# Patient Record
Sex: MALE | Race: WHITE | Employment: OTHER | URBAN - NONMETROPOLITAN AREA
[De-identification: names, ages, dates, MRNs, and addresses within clinical notes are randomized per-mention and may not be internally consistent; named-entity substitution may affect disease eponyms.]

---

## 2021-05-03 ENCOUNTER — HOSPITAL ENCOUNTER (INPATIENT)
Age: 51
LOS: 3 days | Discharge: HOME | DRG: 881 | End: 2021-05-06
Attending: PSYCHIATRY & NEUROLOGY | Admitting: PSYCHIATRY & NEUROLOGY
Payer: OTHER GOVERNMENT

## 2021-05-03 PROBLEM — F32.A DEPRESSION WITH SUICIDAL IDEATION: Status: ACTIVE | Noted: 2021-05-03

## 2021-05-03 PROBLEM — R45.851 DEPRESSION WITH SUICIDAL IDEATION: Status: ACTIVE | Noted: 2021-05-03

## 2021-05-03 PROCEDURE — 99222 1ST HOSP IP/OBS MODERATE 55: CPT | Performed by: PSYCHIATRY & NEUROLOGY

## 2021-05-03 PROCEDURE — 6370000000 HC RX 637 (ALT 250 FOR IP): Performed by: PSYCHIATRY & NEUROLOGY

## 2021-05-03 PROCEDURE — 6370000000 HC RX 637 (ALT 250 FOR IP): Performed by: FAMILY MEDICINE

## 2021-05-03 PROCEDURE — 1240000000 HC EMOTIONAL WELLNESS R&B

## 2021-05-03 RX ORDER — GAUZE BANDAGE 2" X 2"
100 BANDAGE TOPICAL DAILY
Status: DISCONTINUED | OUTPATIENT
Start: 2021-05-03 | End: 2021-05-06 | Stop reason: HOSPADM

## 2021-05-03 RX ORDER — HYDROXYZINE HYDROCHLORIDE 10 MG/1
10 TABLET, FILM COATED ORAL 3 TIMES DAILY PRN
Status: DISCONTINUED | OUTPATIENT
Start: 2021-05-03 | End: 2021-05-06 | Stop reason: HOSPADM

## 2021-05-03 RX ORDER — HYDROCODONE BITARTRATE AND ACETAMINOPHEN 5; 325 MG/1; MG/1
1 TABLET ORAL EVERY 6 HOURS PRN
Status: ON HOLD | COMMUNITY
Start: 2021-02-21 | End: 2021-05-06 | Stop reason: HOSPADM

## 2021-05-03 RX ORDER — FOLIC ACID 1 MG/1
1 TABLET ORAL DAILY
Status: DISCONTINUED | OUTPATIENT
Start: 2021-05-03 | End: 2021-05-06 | Stop reason: HOSPADM

## 2021-05-03 RX ORDER — POLYETHYLENE GLYCOL 3350 17 G/17G
17 POWDER, FOR SOLUTION ORAL DAILY PRN
Status: DISCONTINUED | OUTPATIENT
Start: 2021-05-03 | End: 2021-05-06 | Stop reason: HOSPADM

## 2021-05-03 RX ORDER — LORAZEPAM 1 MG/1
1 TABLET ORAL EVERY 4 HOURS PRN
Status: DISCONTINUED | OUTPATIENT
Start: 2021-05-03 | End: 2021-05-06 | Stop reason: HOSPADM

## 2021-05-03 RX ORDER — MIRTAZAPINE 7.5 MG/1
7.5 TABLET, FILM COATED ORAL NIGHTLY
Status: DISCONTINUED | OUTPATIENT
Start: 2021-05-03 | End: 2021-05-06 | Stop reason: HOSPADM

## 2021-05-03 RX ORDER — MULTIVITAMIN WITH IRON
1 TABLET ORAL DAILY
Status: DISCONTINUED | OUTPATIENT
Start: 2021-05-03 | End: 2021-05-06 | Stop reason: HOSPADM

## 2021-05-03 RX ORDER — LORAZEPAM 1 MG/1
2 TABLET ORAL EVERY 4 HOURS PRN
Status: DISCONTINUED | OUTPATIENT
Start: 2021-05-03 | End: 2021-05-06 | Stop reason: HOSPADM

## 2021-05-03 RX ORDER — NICOTINE 21 MG/24HR
1 PATCH, TRANSDERMAL 24 HOURS TRANSDERMAL DAILY
Status: DISCONTINUED | OUTPATIENT
Start: 2021-05-03 | End: 2021-05-06 | Stop reason: HOSPADM

## 2021-05-03 RX ORDER — TIZANIDINE 4 MG/1
4 TABLET ORAL EVERY 8 HOURS PRN
Status: ON HOLD | COMMUNITY
End: 2021-05-06 | Stop reason: HOSPADM

## 2021-05-03 RX ORDER — ACETAMINOPHEN 325 MG/1
650 TABLET ORAL EVERY 4 HOURS PRN
Status: DISCONTINUED | OUTPATIENT
Start: 2021-05-03 | End: 2021-05-04

## 2021-05-03 RX ORDER — BUSPIRONE HYDROCHLORIDE 10 MG/1
10 TABLET ORAL 3 TIMES DAILY
Status: ON HOLD | COMMUNITY
End: 2021-05-06 | Stop reason: HOSPADM

## 2021-05-03 RX ADMIN — HYDROXYZINE HYDROCHLORIDE 10 MG: 10 TABLET, FILM COATED ORAL at 21:01

## 2021-05-03 RX ADMIN — MIRTAZAPINE 7.5 MG: 7.5 TABLET ORAL at 21:00

## 2021-05-03 RX ADMIN — FOLIC ACID 1 MG: 1 TABLET ORAL at 21:00

## 2021-05-03 RX ADMIN — THIAMINE HCL TAB 100 MG 100 MG: 100 TAB at 21:00

## 2021-05-03 RX ADMIN — THERA TABS 1 TABLET: TAB at 21:00

## 2021-05-03 ASSESSMENT — PAIN DESCRIPTION - PROGRESSION: CLINICAL_PROGRESSION: NOT CHANGED

## 2021-05-03 ASSESSMENT — PAIN DESCRIPTION - ONSET: ONSET: ON-GOING

## 2021-05-03 ASSESSMENT — SLEEP AND FATIGUE QUESTIONNAIRES
SLEEP PATTERN: DIFFICULTY FALLING ASLEEP;DISTURBED/INTERRUPTED SLEEP;INSOMNIA;RESTLESSNESS;NIGHTMARES/TERRORS
DIFFICULTY FALLING ASLEEP: YES
DO YOU USE A SLEEP AID: NO
DIFFICULTY ARISING: NO

## 2021-05-03 ASSESSMENT — PAIN - FUNCTIONAL ASSESSMENT: PAIN_FUNCTIONAL_ASSESSMENT: ACTIVITIES ARE NOT PREVENTED

## 2021-05-03 ASSESSMENT — PAIN DESCRIPTION - DESCRIPTORS: DESCRIPTORS: ACHING

## 2021-05-03 ASSESSMENT — PAIN SCALES - GENERAL: PAINLEVEL_OUTOF10: 2

## 2021-05-03 ASSESSMENT — PAIN DESCRIPTION - LOCATION: LOCATION: SHOULDER

## 2021-05-03 ASSESSMENT — PAIN DESCRIPTION - PAIN TYPE: TYPE: CHRONIC PAIN

## 2021-05-03 NOTE — PROGRESS NOTES
SW met with treatment team to discuss pt's progress and setbacks. SW 2 was present. Pt was admitted, for SI, HI, alcohol abuse, threatened to shoot himself and his spouse in the head, has hx of psychiatric treatment with [facility], pt did not have a gun, has not been taking his medications, identifies current stressors as the death of his friend in a  accident, endorses AVH. Since admission, pt reportedly was cooperative, but groggy from medications given prior to admission, limited information was obtained, pt complained of being tired and wanting to go to sleep, denied SI/HI/AVH, continue current treatment plan.

## 2021-05-03 NOTE — PROGRESS NOTES
Behavioral Services  Medicare Certification Upon Admission    I certify that this patient's inpatient psychiatric hospital admission is medically necessary for:    [x] (1) Treatment which could reasonably be expected to improve this patient's condition,       [] (2) Or for diagnostic study;     AND     [x](2) The inpatient psychiatric services are provided while the individual is under the care of a physician and are included in the individualized plan of care.     Estimated length of stay/service 5 days to 7 weeks    Plan for post-hospital care TBA    Electronically signed by Ant Pena MD on 5/3/2021 at 2:31 PM

## 2021-05-03 NOTE — PROGRESS NOTES
`[facility]al [facility] [place]  Admission Note     Admission Type:   Admission Type: Voluntary    Reason for admission:  Reason for Admission: Miguel Arcos is a 45 YOA WM. He was seen at [facility] 05/02/2021 PM for SI, HI, ETOH intoxication, psychosis and auditory hallucinations. Per report from transferring facility patient was intoxicated 5/2/21 PM and threatened to shoot himself and his spouse in the head. Patient did not have gun during threating statements. Patient had initial BA of 113, approximately two hours later BA was down to 62. Patient was assessed by [facility] and need for admission was determined. Patient c/o PTSD triggered a few days ago when his friend was in severe  accident. Patient endorses not taking prescibed psychiatric medications and recent feelings of \"all alone\" with self isolation. Upon arrival to this facility patient denies SI, HI, AVH and c/o \"I am very tired from taking that Trazodone. \"  Admission interview was limited d/t this. PATIENT STRENGTHS: UTD at this time. Patient Strengths and Limitations: UTD at this time       Addictive Behavior: Yes, past h/o of ETOH abuse with current intermittent ETOH use. Medical Problems:   No past medical history on file. Status EXAM:  Status and Exam  Normal: No  Facial Expression: Flat  Affect: Congruent  Level of Consciousness: Alert  Mood:Normal: No  Mood: Depressed, Anxious, Labile  Motor Activity:Normal: No  Motor Activity: Decreased  Interview Behavior: Cooperative  Preception: [place] to Person, Allena Zayra to Time, [place] to Place, [place] to Situation  Attention:Normal: No  Attention: Unable to Concentrate  Thought Processes: Circumstantial  Thought Content:Normal: No  Thought Content: Other(See Comment), Preoccupations(sleepy r/t Trazodone given prior to admission)  Hallucinations:  Auditory (Comment)(voices on and off)  Delusions: No  Memory:Normal: No  Memory: Poor Recent, Poor Remote  Insight and Judgment: No  Insight and Judgment: Poor Judgment, Poor Insight  Present Suicidal Ideation: No(not at this time upon admission)  Present Homicidal Ideation: No    Tobacco Screening:  Practical Counseling, on admission, lorna X, if applicable and completed (first 3 are required if patient doesn't refuse):            ( )  Recognizing danger situations (included triggers and roadblocks)                    ( )  Coping skills (new ways to manage stress, exercise, relaxation techniques, changing routine, distraction)                                                           ( )  Basic information about quitting (benefits of quitting, techniques in how to quit, available resources  ( ) Referral for counseling faxed to [facility]                                           (Y ) Patient refused counseling  ( ) Patient has not smoked in the last 30 days    Metabolic Screening:    No results found for: LABA1C    No results found for: CHOL  No results found for: TRIG  No results found for: HDL  No components found for: LDLCAL  No results found for: LABVLDL      Body mass index is 25.37 kg/m². BP Readings from Last 2 Encounters:   05/03/21 105/68           Pt admitted with followings belongings:  Dentures: None(upper missing teeth are missing)  Vision - Corrective Lenses: Glasses(reading glasses, are not with patient at time of admission)  Hearing Aid: Bilateral hearing aids  Jewelry: None  Body Piercings Removed: N/A  Clothing: Shirt, Footwear(swim shorts)  Were All Patient Medications Collected?: Yes(one bottle of medication upon admission,)  Other Valuables: Cell phone, Money (Comment), Wallet      Valuables placed in safe in security envelope. . Patient's home medications were, placed in locked cabinet in [place] [address] medication room, (1 bottle of medications brought with patient). .  Patient oriented to surroundings and program expectations and copy of patient rights given.  Received

## 2021-05-03 NOTE — H&P
Department of Psychiatry  Attending History and Physical        CHIEF COMPLAINT:  \"I'm ok\"    History obtained from: patient, chart    HISTORY OF PRESENT ILLNESS:    46 yo white male with history of depression, anxiety, PTSD, alcohol use disorder, transferred from [facility] for SI and HI towards his wife. He reportedly threatened to shoot himself and his wife.  originally. Patient was assessed by [facility]. He has been noncompliant with his psychotropics at home. Patient is observed resting in bed this morning. He is calm and cooperative. Appears drowsy. Denies suicidal and homicidal ideation at this time. Reports some depression and anxiety. States his PTSD was recently triggered by his friend's  accident. He was drinking prior to admission and got into an argument with his wife. States they have been having arguments for a while and he has been feeling angry a lot. He denies any recent stressors. Feels that pandemic has affected him a lot. \"I no longer leave my house. \"  Patient reports occasional mood swings and racing thoughts. He reports good sleep and appetite. No nightmares and flashbacks. Often feels irritable. Most recently, started on BuSpar for anxiety. He is open to medication adjustment. States trazodone made him groggy this morning. He denies withdrawal symptoms. Gave us permission to talk to his wife. PSYCHIATRIC HISTORY:    Diagnoses: depression, PTSD  Suicide attempts/gestures: Denies   Prior hospitalizations: [facility] - 4 yrs ago    Medication [facility]: [facility]  [facility]  Head trauma: Denies    SUBSTANCE USE HISTORY:  Drinks beer occasionally. Denies illicit drug use. Dips tobacco.     Past Medical History:    Chronic pain     Past Surgical History:    No past surgical history on file. Medications Prior to Admission:   Buspar 10 mg tid    Allergies:  Patient has no known allergies. Social History:  , on disability.  Former marine. Family History:   No history of psychiatric illness or suicide attempts. REVIEW OF SYSTEMS:  General: No fevers, chills, night sweats, no recent weight loss or gain. Head: No headache, no migraine. Eyes: No recent visual changes. Ears: No recent hearing changes. Nose: No increased congestion or change in sense of smell. Throat: No sore throat, no trouble swallowing. Cardiovascular: No chest pain or palpitations, or dizziness. Respiratory: No cough, wheezes, congestion, or shortness of breath. Gastrointestinal: No abdominal pain, nausea or vomiting, no diarrhea or constipation. Musculo-skeletal: No edema, deformities, or loss of functions. Neurological: No loss of consciousness, abnormal sensations, or weakness. Skin: No rash, abrasions or bruises. PHYSICAL EXAM:  GENERAL APPEARANCE: 45y.o. year-old male in NAD   HEAD: Normocephalic, atraumatic. THROAT: No erythema, exudates, lesions. No tongue laceration. CARDIOVASCULAR: PMI nondisplaced. Regular rhythm and rate. Normal S1 and S2.  PULMONARY: Clear to auscultation bilaterally, no tenderness to palpation. ABDOMEN: Soft, nontender, nondistended. MUSCULOSKELTAL: No obvious deformities, clubbing, cyanosis or edema, no spinous process or paraspinous tenderness, normal ROM, normal gait, distal pulses intact symmetric 2+ bilaterally. NEUROLOGICAL: Alert, oriented x 3, CN II-XII grossly intact, motor strength 5/5 all muscle groups, DTR 2+ intact and symmetric, sensation intact to sharp and dull. No abnormal movements or tremors. SKIN: Warm, dry, intact, no rash, abrasions bruises    Vitals:  /68   Pulse 80   Temp 97.6 °F (36.4 °C) (Temporal)   Resp 18   Ht 5' 4\" (1.626 m)   Wt 147 lb 12.8 oz (67 kg)   SpO2 100%   BMI 25.37 kg/m²     Mental Status Examination:    Appearance: Stated age. Gait stable. No abnormal movements or tremor. Behavior: Calm, cooperative, withdrawn  Speech: Normal in tone, volume, and quality.  No slurring, dysarthria or pressured speech noted. Mood: \"Tired \"   Affect: Mood congruent. Thought Process: Appears linear. Thought Content: Denies SI/HI. No overt delusions or paranoia appreciated. Perceptions: Denies auditory or visual hallucinations at present time. Not responding to internal stimuli. Concentration: Intact. Orientation: to person, place, date, and situation. Language: Intact. Fund of information: Intact. Memory: Recent and remote appear intact. Neurovegitative: Fair appetite and sleep. Insight: Limited. Judgment: Limited. DATA:  No results found for: WBC, HGB, HCT, MCV, PLT  No results found for: NA, K, CL, CO2, BUN, CREATININE, GLUCOSE, CALCIUM, PROT, LABALBU, BILITOT, ALKPHOS, AST, ALT, LABGLOM, GFRAA, AGRATIO, GLOB    ASSESSMENT AND PLAN:  DSM-5 DIAGNOSIS:   Impression:  Mood disorder unspecified  R/o substance-induced mood disorder  PTSD by history  Alcohol use disorder, severe  Tobacco use disorder    Patient presents with limited insight and judgement and is meeting the criteria for inpatient psychiatric treatment. Plan:   -Admit to [facility] Unit and monitor on 15 minute checks. Suicide, fall and seizure precautions.  Levora Miss reviewed. -Gather collateral information from family with release.  -Medical monitoring to be performed by Dr. Jr Noel and associates. Order routine labs. [facility] protocol.   -Acclimate to the unit. Provide supportive psychotherapy.  -Encourage participation in groups and therapeutic activities as appropriate. Work on coping skills. -Medications:    A trial of Remeron to help with depressed mood and sleep. Discussed alternatives, benefits & risks with patient including - but not limited to - black box warning regarding increase in suicidality (though in children & young adults and lack of evidence of increased risk in those over 24 yrs.  old), agranulocytosis, possibility of death given concurrent untreated sleep apnea, hypotension, worsening mood, hyponatremia, Mcallister-Alex syndrome, weight gain, dizziness, abnormal dreams/thinking, confusion, myalgia, elevated LFTs. PRN Atarax for anxiety. Discussed benefits, alternatives, and risks including but not limited to wheezing, dyspnea, seizures, dry mouth, dizziness, increased fall risk, agitation, nausea. Advised caution in operating vehicles/machinery after taking, especially if sedation occurs.      Offer nicotine patch to help with nicotine withdrawal.    -The risks, benefits, side effects, indications, contraindications, and adverse effects of the medications have been discussed.  -The patient has verbalized understanding and has capacity to give informed consent.  -SW help evaluate home environment and provide outpatient resources.  -Discuss with treatment team.

## 2021-05-03 NOTE — PROGRESS NOTES
SW completed psychosocial and CSSR-S on this date. Pt long and short term goals discussed. Pt voiced understanding. Treatment plan sheet signed. Pt verbalized understanding of the treatment plan. Pt participated in goals and objectives of the treatment plan. It was identified that pt will require outpatient follow up appointments at local [facility] behavioral health facility such as[address]. Pt validated need for appointments. Pt was also provided a handout of contact information for drug and alcohol treatment centers and other community support service such as [facility]. In the last 6 months has the pt been danger to self: Yes  In the last 6 months has the pt been danger to others: No  Legal Guardian/POA: No      Provided pt with [employer] handout entitled \"Quitting Smoking,\" reviewed handout with pt addressing dangers of smoking, developing coping skills, and providing basic information about quitting. Patient received all components / Patient refused/declined practical counseling of tobacco practical counseling during the hospital stay.      Electronically signed by Horacio Brown on 5/3/2021 at 10:35 AM

## 2021-05-03 NOTE — PROGRESS NOTES
Admission Note      Reason for admission/Target Symptom: Patient admitted to [facility] due to [facility] with plan to shoot self , HI towards wife, drinking alcohol, AH, pt was [facility], PTSD, feelings of being alone. Diagnoses: Depression NOS  UDS: None specified in chart  BAL:  None specified in chart    SW met with treatment team to discuss patient's treatment including care planning, discharge planning, and follow-up needs. Pt has been admitted to [facility]. Treatment team has identified patient's discharge needs as medication management and outpatient therapy/counseling. Pt confirmed  the need for ongoing treatment post inpatient stay. Pt was also provided a handout of contact information for drug and alcohol treatment centers and other community support service such as [facility], [facility], and [facility].     Electronically signed by Rosalba Jacob, [address] on 5/3/2021 at 10:39 AM

## 2021-05-03 NOTE — H&P
HISTORY and PHYSICAL      CHIEF COMPLAINT:  SI, HI    Reason for Admission:  SI,  HI    History Obtained From:  Patient, chart    HISTORY OF PRESENT ILLNESS:      The patient is a 45 y.o. male who is admitted to the [place] [address] unit with worsening mood issues. He has no c/o CP or SOA. No abdominal pain or N/V. No dysuria. No new pain complaints. No fevers. Past Medical History:    No past medical history on file. Past Surgical History:    No past surgical history on file. Medications Prior to Admission:    Medications Prior to Admission: HYDROcodone-acetaminophen (NORCO) 5-325 MG per tablet, Take 1 tablet by mouth every 6 hours as needed for Pain. Indications: Pain, Rx'd by Rasheeda Bernardo. tiZANidine (ZANAFLEX) 4 MG tablet, Take 4 mg by mouth every 8 hours as needed (Muscle relaxer.) As per copy of medical records. busPIRone (BUSPAR) 10 MG tablet, Take 10 mg by mouth 3 times daily As per bottle. Allergies:  Patient has no known allergies. Social History:   TOBACCO:   has no history on file for tobacco.  ETOH:   has no history on file for alcohol. DRUGS:   has no history on file for drug. MARITAL STATUS:    OCCUPATION:  Not working  Patient currently lives with family       Family History:   No family history on file. REVIEW OF SYSTEMS:  Constitutional: neg  CV: neg  Pulmonary: neg  GI: neg  : neg  Psych: SI, HI  Neuro: neg  Skin: neg  MusculoSkeletal: neg  HEENT: neg  Joints: neg    Vitals:  /68   Pulse 80   Temp 97.6 °F (36.4 °C) (Temporal)   Resp 18   Ht 5' 4\" (1.626 m)   Wt 147 lb 12.8 oz (67 kg)   SpO2 100%   BMI 25.37 kg/m²     PHYSICAL EXAM:  Gen: NAD, alert  HEENT: WNL  Lymph: no LAD  Neck: no JVD or masses  Chest: CTA bilat  CV: RRR  Abdomen: NT/ND  Extrem: no C/C/E  Neuro: non focal  Skin: no rashes  Joints: no redness    DATA:  I have reviewed the admission labs and imaging tests. ASSESSMENT AND PLAN:      Active Problems:    Depression with suicidal ideation---follow with Psych    H/O ETOH Use        Lali Rodríguez MD  6:16 PM 5/3/2021

## 2021-05-03 NOTE — PLAN OF CARE
Problem: Health Behavior:  Goal: Ability to verbalize adaptive coping strategies to use when suicidal feelings occur will improve  Description: Ability to verbalize adaptive coping strategies to use when suicidal feelings occur will improve  Outcome: Ongoing  Note:                                                                     Group Therapy Note    Date: 5/3/2021  Start Time: 1030  End Time:  1100  Number of Participants: 3    Type of Group: Psychoeducation    Wellness Binder Information  Module Name:  Men's Issues  Session Number:  1    Group Goal for Pt: To improve knowledge of effective stress management techniques    Notes:  Pt demonstrated improved knowledge of effective stress management techniques by actively participating in group discussion. Status After Intervention:  Improved    Participation Level:  Active Listener and Interactive    Participation Quality: Appropriate and Attentive      Speech:  normal      Thought Process/Content: Logical      Affective Functioning: Congruent      Mood: anxious and depressed      Level of consciousness:  Alert and Oriented x4      Response to Learning: Able to verbalize current knowledge/experience, Able to verbalize/acknowledge new learning, and Progressing to goal      Endings: None Reported    Modes of Intervention: Education      Discipline Responsible: Psychoeducational Specialist      Signature:  Stevenson Jerome

## 2021-05-04 LAB
CHOLESTEROL, TOTAL: 161 MG/DL (ref 160–199)
HBA1C MFR BLD: 5.1 % (ref 4–6)
HDLC SERPL-MCNC: 58 MG/DL (ref 55–121)
LDL CHOLESTEROL CALCULATED: 78 MG/DL
TRIGL SERPL-MCNC: 127 MG/DL (ref 0–149)
TSH REFLEX FT4: 1.48 UIU/ML (ref 0.35–5.5)
VITAMIN B-12: 436 PG/ML (ref 211–946)
VITAMIN D 25-HYDROXY: 24.9 NG/ML

## 2021-05-04 PROCEDURE — 99231 SBSQ HOSP IP/OBS SF/LOW 25: CPT | Performed by: PSYCHIATRY & NEUROLOGY

## 2021-05-04 PROCEDURE — 82306 VITAMIN D 25 HYDROXY: CPT

## 2021-05-04 PROCEDURE — 84443 ASSAY THYROID STIM HORMONE: CPT

## 2021-05-04 PROCEDURE — 6370000000 HC RX 637 (ALT 250 FOR IP): Performed by: FAMILY MEDICINE

## 2021-05-04 PROCEDURE — 1240000000 HC EMOTIONAL WELLNESS R&B

## 2021-05-04 PROCEDURE — 36415 COLL VENOUS BLD VENIPUNCTURE: CPT

## 2021-05-04 PROCEDURE — 6370000000 HC RX 637 (ALT 250 FOR IP): Performed by: PSYCHIATRY & NEUROLOGY

## 2021-05-04 PROCEDURE — 83036 HEMOGLOBIN GLYCOSYLATED A1C: CPT

## 2021-05-04 PROCEDURE — 82607 VITAMIN B-12: CPT

## 2021-05-04 PROCEDURE — 80061 LIPID PANEL: CPT

## 2021-05-04 RX ORDER — MELOXICAM 15 MG/1
15 TABLET ORAL DAILY
COMMUNITY

## 2021-05-04 RX ORDER — ACETAMINOPHEN 325 MG/1
650 TABLET ORAL EVERY 4 HOURS PRN
Status: DISCONTINUED | OUTPATIENT
Start: 2021-05-04 | End: 2021-05-06 | Stop reason: HOSPADM

## 2021-05-04 RX ORDER — ERGOCALCIFEROL 1.25 MG/1
50000 CAPSULE ORAL WEEKLY
Status: DISCONTINUED | OUTPATIENT
Start: 2021-05-04 | End: 2021-05-06 | Stop reason: HOSPADM

## 2021-05-04 RX ADMIN — THERA TABS 1 TABLET: TAB at 08:33

## 2021-05-04 RX ADMIN — MIRTAZAPINE 7.5 MG: 7.5 TABLET ORAL at 21:04

## 2021-05-04 RX ADMIN — FOLIC ACID 1 MG: 1 TABLET ORAL at 08:35

## 2021-05-04 RX ADMIN — THIAMINE HCL TAB 100 MG 100 MG: 100 TAB at 08:35

## 2021-05-04 RX ADMIN — ERGOCALCIFEROL 50000 UNITS: 1.25 CAPSULE ORAL at 09:58

## 2021-05-04 RX ADMIN — HYDROXYZINE HYDROCHLORIDE 10 MG: 10 TABLET, FILM COATED ORAL at 19:41

## 2021-05-04 NOTE — PROGRESS NOTES
[facility] Daily Shift Assessment-Geriatric Unit  Nursing Progress Note          Room: [facility]620-01   Name: Miguel Arcos   Age: 45 y.o. Gender: male   Dx: <principal problem not specified>  Precautions: suicide risk, fall risk and seizure precautions  Inpatient Status: voluntary     SLEEP:    Sleep: Yes,   Sleep Quality Fair   Hours Slept:    Sleep Medications: Yes  PRN Sleep Meds: No       MEDICAL:      Other PRN Meds: Yes   Med Compliant: Yes   Accu-Chek: No   Oxygen/CPAP/BiPAP: No  CIWA/CINA: No   PAIN Assessment: location, shoulder pain  Side Effects from medication: No    Is Patient experiencing any respiratory symptoms (headache, fever, body aches, cough. Red  ): no  Patient educated by nursing to practice social distancing, wear masks, wash hands frequently: yes      Metabolic Screening:    No results found for: LABA1C    No results found for: CHOL  No results found for: TRIG  No results found for: HDL  No components found for: LDLCAL  No results found for: LABVLDL      Body mass index is 25.37 kg/m². BP Readings from Last 2 Encounters:   05/03/21 114/73         PSYCH:     SI denies suicidal ideation    HI Negative for homicidal ideation        AVH:Absent      Depression: improved Anxiety: on/ off       GENERAL:      Appetite: good  Social: No Speech: normal   Appearance:appropriately dressed  Assistive Devices: noneLevel of Assist: Independent      GROUP:    Group Participation: Yes  Participation LevelActive Listener    Participation QualityAppropriate    Notes:     Patient was isolating at the beginning of the shift. Patient came out of his room to watch television and have a snack. He has reported that he has felt tired all day. He has made several phone calls this evening. The phone calls appear to have gone well. Patient reports that his depression and anxiety have improved and he is just ready to go home. Patient has denied SI, HI and AVH this evening.  Although he has reported Hallucinations were less during his wrap up group.        Electronically signed by Ayleen Perez RN on 5/4/21 at 1:29 AM CDT

## 2021-05-04 NOTE — PLAN OF CARE
Problem: Health Behavior:  Goal: Ability to verbalize adaptive coping strategies to use when suicidal feelings occur will improve  Description: Ability to verbalize adaptive coping strategies to use when suicidal feelings occur will improve  5/4/2021 1033 by Romy Stubbs  Outcome: Ongoing  Note:                                                                     Group Therapy Note    Date: 5/4/2021  Start Time: 1000  End Time:  1020  Number of Participants: 4    Type of Group: Psychoeducation    Wellness Binder Information  Module Name:  Relapse Prevention  Session Number:  5    Group Goal for Pt: To improve knowledge of relapse prevention strategies    Notes:  Pt demonstrated improved knowledge of relapse prevention strategies by actively participating in group discussion. Status After Intervention:  Unchanged    Participation Level:  Active Listener and Interactive    Participation Quality: Appropriate and Attentive      Speech:  normal      Thought Process/Content: Logical      Affective Functioning: Congruent      Mood: anxious and depressed      Level of consciousness:  Alert and Oriented x4      Response to Learning: Able to verbalize current knowledge/experience, Able to verbalize/acknowledge new learning, and Progressing to goal      Endings: None Reported    Modes of Intervention: Education      Discipline Responsible: Psychoeducational Specialist      Signature:  Romy Stubbs

## 2021-05-04 NOTE — PLAN OF CARE
Problem: Falls - Risk of:  Goal: Will remain free from falls  Description: Will remain free from falls  Outcome: Ongoing     Problem: Falls - Risk of:  Goal: Absence of physical injury  Description: Absence of physical injury  Outcome: Ongoing     Problem: Pain:  Description: Pain management should include both nonpharmacologic and pharmacologic interventions. Goal: Pain level will decrease  Description: Pain level will decrease  Outcome: Ongoing     Problem: Pain:  Description: Pain management should include both nonpharmacologic and pharmacologic interventions. Goal: Control of acute pain  Description: Control of acute pain  Outcome: Ongoing     Problem: Pain:  Description: Pain management should include both nonpharmacologic and pharmacologic interventions.   Goal: Control of chronic pain  Description: Control of chronic pain  Outcome: Ongoing     Problem: Health Behavior:  Goal: Ability to verbalize adaptive coping strategies to use when the urge to self-mutilate occurs will improve  Description: Ability to verbalize adaptive coping strategies to use when the urge to self-mutilate occurs will improve  Outcome: Ongoing     Problem: Safety:  Goal: Ability to contract for his/her safety will improve  Description: Ability to contract for his/her safety will improve  Outcome: Ongoing     Problem: Altered Mood, Depressive Behavior:  Goal: Able to verbalize acceptance of life and situations over which he or she has no control  Description: Able to verbalize acceptance of life and situations over which he or she has no control  Outcome: Ongoing     Problem: Altered Mood, Depressive Behavior:  Goal: Able to verbalize and/or display a decrease in depressive symptoms  Description: Able to verbalize and/or display a decrease in depressive symptoms  Outcome: Ongoing     Problem: Altered Mood, Depressive Behavior:  Goal: Ability to disclose and discuss suicidal ideas will improve  Description: Ability to disclose and discuss suicidal ideas will improve  Outcome: Ongoing     Problem: Altered Mood, Depressive Behavior:  Goal: Able to verbalize support systems  Description: Able to verbalize support systems  Outcome: Ongoing     Problem: Altered Mood, Depressive Behavior:  Goal: Absence of self-harm  Description: Absence of self-harm  Outcome: Ongoing     Problem: Altered Mood, Depressive Behavior:  Goal: Patient specific goal  Description: Patient specific goal  Outcome: Ongoing     Problem: Altered Mood, Depressive Behavior:  Goal: Participates in care planning  Description: Participates in care planning  Outcome: Ongoing     Problem: Depressive Behavior With or Without Suicide Precautions:  Goal: Able to verbalize and/or display a decrease in depressive symptoms  Description: Able to verbalize and/or display a decrease in depressive symptoms  Outcome: Ongoing     Problem: Depressive Behavior With or Without Suicide Precautions:  Goal: Ability to disclose and discuss suicidal ideas will improve  Description: Ability to disclose and discuss suicidal ideas will improve  Outcome: Ongoing     Problem: Depressive Behavior With or Without Suicide Precautions:  Goal: Able to verbalize support systems  Description: Able to verbalize support systems  Outcome: Ongoing     Problem: Depressive Behavior With or Without Suicide Precautions:  Goal: Absence of self-harm  Description: Absence of self-harm  Outcome: Ongoing     Problem: Depressive Behavior With or Without Suicide Precautions:  Goal: Patient specific goal  Description: Patient specific goal  Outcome: Ongoing     Problem: Depressive Behavior With or Without Suicide Precautions:  Goal: Participates in care planning  Description: Participates in care planning  Outcome: Ongoing     Problem: Altered Mood, Deterioration in Function:  Goal: Ability to perform activities of daily living will improve  Description: Ability to perform activities of daily living will improve  Outcome: Ongoing Problem: Altered Mood, Deterioration in Function:  Goal: Able to verbalize reality based thinking  Description: Able to verbalize reality based thinking  Outcome: Ongoing     Problem: Altered Mood, Deterioration in Function:  Goal: Skin appearance normal  Description: Skin appearance normal  Outcome: Ongoing     Problem: Altered Mood, Deterioration in Function:  Goal: Maintenance of adequate nutrition will improve  Description: Maintenance of adequate nutrition will improve  Outcome: Ongoing     Problem: Altered Mood, Deterioration in Function:  Goal: Ability to tolerate increased activity will improve  Description: Ability to tolerate increased activity will improve  Outcome: Ongoing     Problem: Altered Mood, Deterioration in Function:  Goal: Participates in care planning  Description: Participates in care planning  Outcome: Ongoing     Problem: Altered Mood, Deterioration in Function:  Goal: Patient specific goal  Description: Patient specific goal  Outcome: Ongoing     Problem: Altered Mood, Psychotic Behavior:  Goal: Able to demonstrate trust by eating, participating in treatment and following staff's direction  Description: Able to demonstrate trust by eating, participating in treatment and following staff's direction  Outcome: Ongoing     Problem: Altered Mood, Psychotic Behavior:  Goal: Able to verbalize decrease in frequency and intensity of hallucinations  Description: Able to verbalize decrease in frequency and intensity of hallucinations  Outcome: Ongoing     Problem: Altered Mood, Psychotic Behavior:  Goal: Able to verbalize reality based thinking  Description: Able to verbalize reality based thinking  Outcome: Ongoing     Problem: Altered Mood, Psychotic Behavior:  Goal: Absence of self-harm  Description: Absence of self-harm  Outcome: Ongoing     Problem: Altered Mood, Psychotic Behavior:  Goal: Ability to achieve adequate nutritional intake will improve  Description: Ability to achieve adequate nutritional intake will improve  Outcome: Ongoing     Problem: Altered Mood, Psychotic Behavior:  Goal: Ability to interact with others will improve  Description: Ability to interact with others will improve  Outcome: Ongoing     Problem: Altered Mood, Psychotic Behavior:  Goal: Compliance with prescribed medication regimen will improve  Description: Compliance with prescribed medication regimen will improve  Outcome: Ongoing     Problem: Altered Mood, Psychotic Behavior:  Goal: Patient specific goal  Description: Patient specific goal  Outcome: Ongoing

## 2021-05-04 NOTE — PROGRESS NOTES
WRAP UP GROUP NOTE    Patient's Goal:  Providing feedback as to their own progress in the care-plan provided. Patients have an opportunity to explore self-reflective skills and share any additional cares and concerns not yet addressed. Pt effectively participated.     Energy Level:low  Appetite:good  Concentration:normal  Hallucinations:less  Depression:improved  Anxiety:on/off  How I worked today:tried a lot  What helps me sleep:take medicine  Any questions/complaints/comments:      Group/ activities that helped me today were:    Life skills      Electronically signed by Kiana Valerio RN on 5/3/2021 at 7:56 PM

## 2021-05-04 NOTE — PROGRESS NOTES
SW met with treatment team to discuss pt's progress and setbacks. SW 2 was present. Pt reportedly slept fair last night, with medications, appetite is good, attends scheduled group activities, isolative to self, interactive when prompted, independent with ADL's, compliant with medications, CIWA score is 3,behavior has been calm/cooperative, reports his depression has \"improved\", reports intermittent anxiety, denies SI/HI/AVH, continue current treatment plan.

## 2021-05-04 NOTE — PLAN OF CARE
Problem: Falls - Risk of:  Goal: Will remain free from falls  Description: Will remain free from falls  5/4/2021 0850 by Annabelle Iqzuierdo RN  Outcome: Ongoing  5/3/2021 2315 by Ayleen Perez RN  Outcome: Ongoing  Goal: Absence of physical injury  Description: Absence of physical injury  5/4/2021 0850 by Annabelle Izquierdo RN  Outcome: Ongoing  5/3/2021 2315 by Ayleen Perez RN  Outcome: Ongoing     Problem: Pain:  Goal: Pain level will decrease  Description: Pain level will decrease  5/4/2021 0850 by Annabelle Izquierdo RN  Outcome: Ongoing  5/3/2021 2315 by Ayleen Perez RN  Outcome: Ongoing  Goal: Control of acute pain  Description: Control of acute pain  5/4/2021 0850 by Annabelle Izquierdo RN  Outcome: Ongoing  5/3/2021 2315 by Ayleen Perez RN  Outcome: Ongoing  Goal: Control of chronic pain  Description: Control of chronic pain  5/4/2021 0850 by Annabelle Izquierdo RN  Outcome: Ongoing  5/3/2021 2315 by Ayleen Perez RN  Outcome: Ongoing     Problem: Health Behavior:  Goal: Ability to verbalize adaptive coping strategies to use when suicidal feelings occur will improve  Description: Ability to verbalize adaptive coping strategies to use when suicidal feelings occur will improve  5/4/2021 0850 by Annabelle Izquierdo RN  Outcome: Ongoing  5/3/2021 2315 by Ayleen Perez RN  Outcome: Ongoing  Goal: Ability to verbalize adaptive coping strategies to use when the urge to self-mutilate occurs will improve  Description: Ability to verbalize adaptive coping strategies to use when the urge to self-mutilate occurs will improve  5/4/2021 0850 by Annabelle Izquierdo RN  Outcome: Ongoing  5/3/2021 2315 by Ayleen Perez RN  Outcome: Ongoing     Problem: Safety:  Goal: Ability to contract for his/her safety will improve  Description: Ability to contract for his/her safety will improve  5/4/2021 0850 by Annabelle Izquierdo RN  Outcome: Ongoing  5/3/2021 2315 by Ayleen Perez RN  Outcome: Ongoing     Problem: Altered Mood, Depressive Behavior:  Goal: Able to verbalize acceptance of life and situations over which he or she has no control  Description: Able to verbalize acceptance of life and situations over which he or she has no control  5/4/2021 0850 by Nicolette Lin RN  Outcome: Ongoing  5/3/2021 2315 by Sedrick Cope RN  Outcome: Ongoing  Goal: Able to verbalize and/or display a decrease in depressive symptoms  Description: Able to verbalize and/or display a decrease in depressive symptoms  5/4/2021 0850 by Nicolette Lin RN  Outcome: Ongoing  5/3/2021 2315 by Sedrick Cope RN  Outcome: Ongoing  Goal: Ability to disclose and discuss suicidal ideas will improve  Description: Ability to disclose and discuss suicidal ideas will improve  5/4/2021 0850 by Nicolette Lin RN  Outcome: Ongoing  5/3/2021 2315 by Sedrick Cope RN  Outcome: Ongoing  Goal: Able to verbalize support systems  Description: Able to verbalize support systems  5/4/2021 0850 by Nicolette Lin RN  Outcome: Ongoing  5/3/2021 2315 by Sedrick Cope RN  Outcome: Ongoing  Goal: Absence of self-harm  Description: Absence of self-harm  5/4/2021 0850 by Nicolette Lin RN  Outcome: Ongoing  5/3/2021 2315 by Sedrick Cope RN  Outcome: Ongoing  Goal: Patient specific goal  Description: Patient specific goal  5/4/2021 0850 by Nicolette Lin RN  Outcome: Ongoing  5/3/2021 2315 by Sedrick Cope RN  Outcome: Ongoing  Goal: Participates in care planning  Description: Participates in care planning  5/4/2021 0850 by Nicolette Lin RN  Outcome: Ongoing  5/3/2021 2315 by Sedrick Cope RN  Outcome: Ongoing     Problem: Depressive Behavior With or Without Suicide Precautions:  Goal: Able to verbalize acceptance of life and situations over which he or she has no control  Description: Able to verbalize acceptance of life and situations over which he or she has no control  5/4/2021 0850 by Nicolette Lin RN  Outcome: Ongoing  5/3/2021 2315 by Sedrick Cope RN  Outcome: Ongoing  Goal: Able to verbalize and/or display a decrease in depressive symptoms  Description: Able to verbalize and/or display a decrease in depressive symptoms  5/4/2021 0850 by Margie Shipman RN  Outcome: Ongoing  5/3/2021 2315 by Phillip Beckett RN  Outcome: Ongoing  Goal: Ability to disclose and discuss suicidal ideas will improve  Description: Ability to disclose and discuss suicidal ideas will improve  5/4/2021 0850 by Margie Shipman RN  Outcome: Ongoing  5/3/2021 2315 by Phillip Beckett RN  Outcome: Ongoing  Goal: Able to verbalize support systems  Description: Able to verbalize support systems  5/4/2021 0850 by Margie Shipman RN  Outcome: Ongoing  5/3/2021 2315 by Phillip Beckett RN  Outcome: Ongoing  Goal: Absence of self-harm  Description: Absence of self-harm  5/4/2021 0850 by Margie Shipman RN  Outcome: Ongoing  5/3/2021 2315 by Phillip Beckett RN  Outcome: Ongoing  Goal: Patient specific goal  Description: Patient specific goal  5/4/2021 0850 by Margie Shipman RN  Outcome: Ongoing  5/3/2021 2315 by Phillip Beckett RN  Outcome: Ongoing  Goal: Participates in care planning  Description: Participates in care planning  5/4/2021 0850 by Margie Shipman RN  Outcome: Ongoing  5/3/2021 2315 by Phillip Beckett RN  Outcome: Ongoing     Problem: Altered Mood, Deterioration in Function:  Goal: Ability to perform activities of daily living will improve  Description: Ability to perform activities of daily living will improve  5/4/2021 0850 by Margie Shipman RN  Outcome: Ongoing  5/3/2021 2315 by Phillip Beckett RN  Outcome: Ongoing  Goal: Able to verbalize reality based thinking  Description: Able to verbalize reality based thinking  5/4/2021 0850 by Margie Shipman RN  Outcome: Ongoing  5/3/2021 2315 by Phillip Beckett RN  Outcome: Ongoing  Goal: Skin appearance normal  Description: Skin appearance normal  5/4/2021 0850 by Margie Shipman RN  Outcome: Ongoing  5/3/2021 2315 by Phillip Beckett RN  Outcome: Ongoing  Goal: Maintenance of adequate nutrition will 3414 by Harrison Bower RN  Outcome: Ongoing  5/3/2021 2315 by April Olguin RN  Outcome: Ongoing  Goal: Ability to interact with others will improve  Description: Ability to interact with others will improve  5/4/2021 0850 by Harrison Bower RN  Outcome: Ongoing  5/3/2021 2315 by April Olguni RN  Outcome: Ongoing  Goal: Compliance with prescribed medication regimen will improve  Description: Compliance with prescribed medication regimen will improve  5/4/2021 0850 by Harrison Bower RN  Outcome: Ongoing  5/3/2021 2315 by April Olguin RN  Outcome: Ongoing  Goal: Patient specific goal  Description: Patient specific goal  5/4/2021 0850 by Harrison Bower RN  Outcome: Ongoing  5/3/2021 2315 by April Olguin RN  Outcome: Ongoing

## 2021-05-04 NOTE — PROGRESS NOTES
Pt is calm and cooperative, somewhat withdrawn to his room but interactive when prompted. Pt reports that his depression and anxiety are improving. Pt rated sleep as fair last night. Pt denies any hallucinations or suicidal or homicidal thoughts. Pt is working on [employer] today and is planning to attend groups. No distress noted. Will continue to monitor.

## 2021-05-04 NOTE — SUICIDE SAFETY PLAN
SAFETY PLAN    A suicide Safety Plan is a document that supports someone when they are having thoughts of suicide. Warning Signs that indicate a suicidal crisis may be developing: What (situations, thoughts, feelings, body sensations, behaviors, etc.) do you experience that lets you know you are beginning to think about suicide? 1. I get withdrawn      Internal Coping Strategies:  What things can I do (relaxation techniques, hobbies, physical activities, etc.) to take my mind off my problems without contacting another person? 1. Yard work  2. Fishing      People and social settings that provide distraction: Who can I call or where can I go to distract me? 1. Name: Neville Amador    2. Friend   3. Place: [place] at the house to [employer] whom I can ask for help: Who can I call when I need help - for example, friends, family, clergy, someone else? 1. Name: Rolando Harrison or [address] [place] [address] Blvd I can contact during a crisis: Who can I call for help - for example, my doctor, my psychiatrist, my psychologist, a mental health provider, a suicide hotline? 1. Clinician Name: [id number]   Phone: [phone]      Clinician Pager or Emergency Contact #: [phone]    2. Clinician Name: [id number]   Phone: [phone]      Clinician Pager or Emergency Contact #: [phone]    3. Suicide Prevention Lifeline: 5-105-430-TALK (4137)    4. [address] [place] [address] Emergency Services -  for example, [address] [place] [address] Emergency Department:       Emergency Services Address: [address]. [address] [facility]      Emergency Services Phone: [phone]    Making the environment safe: How can I make my environment (house/apartment/living space) safer? For example, can I remove guns, medications, and other items? 1. Remove weapons from the home  2.  Remove extra medications from the home

## 2021-05-05 PROCEDURE — 99231 SBSQ HOSP IP/OBS SF/LOW 25: CPT | Performed by: PSYCHIATRY & NEUROLOGY

## 2021-05-05 PROCEDURE — 6370000000 HC RX 637 (ALT 250 FOR IP): Performed by: FAMILY MEDICINE

## 2021-05-05 PROCEDURE — 1240000000 HC EMOTIONAL WELLNESS R&B

## 2021-05-05 PROCEDURE — 6370000000 HC RX 637 (ALT 250 FOR IP): Performed by: PSYCHIATRY & NEUROLOGY

## 2021-05-05 RX ADMIN — THIAMINE HCL TAB 100 MG 100 MG: 100 TAB at 08:11

## 2021-05-05 RX ADMIN — MIRTAZAPINE 7.5 MG: 7.5 TABLET ORAL at 21:34

## 2021-05-05 RX ADMIN — THERA TABS 1 TABLET: TAB at 08:10

## 2021-05-05 RX ADMIN — FOLIC ACID 1 MG: 1 TABLET ORAL at 08:10

## 2021-05-05 ASSESSMENT — PAIN SCALES - GENERAL: PAINLEVEL_OUTOF10: 0

## 2021-05-05 NOTE — PROGRESS NOTES
Group Therapy Note    Date: 5/5/2021  Start Time: 1430  End Time:  1450  Number of Participants: 1    Type of Group: Healthy Living/Wellness    Status After Intervention:  Improved    Participation Level:  Active Listener and Interactive    Participation Quality: Appropriate and Attentive      Speech:  normal      Thought Process/Content: Logical      Affective Functioning: Congruent      Mood: anxious      Level of consciousness:  Oriented x4      Response to Learning: Progressing to goal      Endings: None Reported    Modes of Intervention: Education      Discipline Responsible: Registered Nurse      Signature:  Dagoberto Little RN

## 2021-05-05 NOTE — PLAN OF CARE
Problem: Falls - Risk of:  Goal: Will remain free from falls  Description: Will remain free from falls  Outcome: Ongoing  Goal: Absence of physical injury  Description: Absence of physical injury  Outcome: Ongoing     Problem: Pain:  Goal: Pain level will decrease  Description: Pain level will decrease  Outcome: Ongoing  Goal: Control of acute pain  Description: Control of acute pain  Outcome: Ongoing  Goal: Control of chronic pain  Description: Control of chronic pain  Outcome: Ongoing     Problem: Health Behavior:  Goal: Ability to verbalize adaptive coping strategies to use when suicidal feelings occur will improve  Description: Ability to verbalize adaptive coping strategies to use when suicidal feelings occur will improve  5/4/2021 2254 by Kiana Patel RN  Outcome: Ongoing  5/4/2021 1033 by Eric Johnson  Outcome: Ongoing  Note:                                                                     Group Therapy Note    Date: 5/4/2021  Start Time: 1000  End Time:  1020  Number of Participants: 4    Type of Group: Psychoeducation    Wellness Binder Information  Module Name:  Relapse Prevention  Session Number:  5    Group Goal for Pt: To improve knowledge of relapse prevention strategies    Notes:  Pt demonstrated improved knowledge of relapse prevention strategies by actively participating in group discussion. Status After Intervention:  Unchanged    Participation Level:  Active Listener and Interactive    Participation Quality: Appropriate and Attentive      Speech:  normal      Thought Process/Content: Logical      Affective Functioning: Congruent      Mood: anxious and depressed      Level of consciousness:  Alert and Oriented x4      Response to Learning: Able to verbalize current knowledge/experience, Able to verbalize/acknowledge new learning, and Progressing to goal      Endings: None Reported    Modes of Intervention: Education      Discipline Responsible: Psychoeducational Specialist Signature:  Gabino Barrett    Goal: Ability to verbalize adaptive coping strategies to use when the urge to self-mutilate occurs will improve  Description: Ability to verbalize adaptive coping strategies to use when the urge to self-mutilate occurs will improve  Outcome: Ongoing     Problem: Safety:  Goal: Ability to contract for his/her safety will improve  Description: Ability to contract for his/her safety will improve  Outcome: Ongoing     Problem: Altered Mood, Depressive Behavior:  Goal: Able to verbalize acceptance of life and situations over which he or she has no control  Description: Able to verbalize acceptance of life and situations over which he or she has no control  Outcome: Ongoing  Goal: Able to verbalize and/or display a decrease in depressive symptoms  Description: Able to verbalize and/or display a decrease in depressive symptoms  Outcome: Ongoing  Goal: Ability to disclose and discuss suicidal ideas will improve  Description: Ability to disclose and discuss suicidal ideas will improve  Outcome: Ongoing  Goal: Able to verbalize support systems  Description: Able to verbalize support systems  Outcome: Ongoing  Goal: Absence of self-harm  Description: Absence of self-harm  Outcome: Ongoing  Goal: Patient specific goal  Description: Patient specific goal  Outcome: Ongoing  Goal: Participates in care planning  Description: Participates in care planning  Outcome: Ongoing     Problem: Depressive Behavior With or Without Suicide Precautions:  Goal: Able to verbalize acceptance of life and situations over which he or she has no control  Description: Able to verbalize acceptance of life and situations over which he or she has no control  Outcome: Ongoing  Goal: Able to verbalize and/or display a decrease in depressive symptoms  Description: Able to verbalize and/or display a decrease in depressive symptoms  Outcome: Ongoing  Goal: Ability to disclose and discuss suicidal ideas will improve  Description: Ability to disclose and discuss suicidal ideas will improve  Outcome: Ongoing  Goal: Able to verbalize support systems  Description: Able to verbalize support systems  Outcome: Ongoing  Goal: Absence of self-harm  Description: Absence of self-harm  Outcome: Ongoing  Goal: Patient specific goal  Description: Patient specific goal  Outcome: Ongoing  Goal: Participates in care planning  Description: Participates in care planning  Outcome: Ongoing     Problem: Altered Mood, Deterioration in Function:  Goal: Ability to perform activities of daily living will improve  Description: Ability to perform activities of daily living will improve  Outcome: Ongoing  Goal: Able to verbalize reality based thinking  Description: Able to verbalize reality based thinking  Outcome: Ongoing  Goal: Skin appearance normal  Description: Skin appearance normal  Outcome: Ongoing  Goal: Maintenance of adequate nutrition will improve  Description: Maintenance of adequate nutrition will improve  Outcome: Ongoing  Goal: Ability to tolerate increased activity will improve  Description: Ability to tolerate increased activity will improve  Outcome: Ongoing  Goal: Participates in care planning  Description: Participates in care planning  Outcome: Ongoing  Goal: Patient specific goal  Description: Patient specific goal  Outcome: Ongoing     Problem: Altered Mood, Psychotic Behavior:  Goal: Able to demonstrate trust by eating, participating in treatment and following staff's direction  Description: Able to demonstrate trust by eating, participating in treatment and following staff's direction  Outcome: Ongoing  Goal: Able to verbalize decrease in frequency and intensity of hallucinations  Description: Able to verbalize decrease in frequency and intensity of hallucinations  Outcome: Ongoing  Goal: Able to verbalize reality based thinking  Description: Able to verbalize reality based thinking  Outcome: Ongoing  Goal: Absence of self-harm  Description: Absence of self-harm  Outcome: Ongoing  Goal: Ability to achieve adequate nutritional intake will improve  Description: Ability to achieve adequate nutritional intake will improve  Outcome: Ongoing  Goal: Ability to interact with others will improve  Description: Ability to interact with others will improve  Outcome: Ongoing  Goal: Compliance with prescribed medication regimen will improve  Description: Compliance with prescribed medication regimen will improve  Outcome: Ongoing  Goal: Patient specific goal  Description: Patient specific goal  Outcome: Ongoing

## 2021-05-05 NOTE — PROGRESS NOTES
Department of Psychiatry  Attending Progress Note     Chief complaint: \"Doing ok\"    SUBJECTIVE:   Chart reviewed, discussed with the team. Patient became upset last night after talking to his wife. Med-compliant. Low CIWA score. Patient is observed watching TV this morning. States he is doing ok. Smiled. No longer upset this am.  Denies suicidal and homicidal ideation. States he slept well. OBJECTIVE    Physical  Wt Readings from Last 3 Encounters:   05/05/21 146 lb 6 oz (66.4 kg)     Temp Readings from Last 3 Encounters:   05/05/21 98.7 °F (37.1 °C) (Temporal)     BP Readings from Last 3 Encounters:   05/05/21 115/66     Pulse Readings from Last 3 Encounters:   05/05/21 63        Review of Systems: 14-point review of systems negative except as described above    Mental Status Examination:   Appearance:  Stated age. Hygiene improved. Gait stable. No abnormal movements or tremor. Behavior: Calm, cooperative  Speech: Normal in tone, volume, and quality. No slurring, dysarthria or pressured speech noted. Mood: \"Ok\"   Affect: Mood congruent. Thought Process: Appears linear. Thought Content:  Denies SI/HI. No overt delusions or paranoia appreciated. Perceptions: Denies auditory or visual hallucinations at present time. Not responding to internal stimuli. Concentration: Intact. Orientation: to person, place, date, and situation. Language: Intact. Fund of information: Intact. Memory: Recent and remote appear intact. Neurovegitative: Improved appetite and sleep. Insight: Improving. Judgment: Improving.     Data  No results found for: WBC, HGB, HCT, MCV, PLT   No results found for: NA, K, CL, CO2, BUN, CREATININE, GLUCOSE, CALCIUM, PROT, LABALBU, BILITOT, ALKPHOS, AST, ALT, LABGLOM, GFRAA, AGRATIO, GLOB    Medications    Current Facility-Administered Medications:     vitamin D (ERGOCALCIFEROL) capsule 50,000 Units, 50,000 Units, Oral, Weekly, Hansa Snyder MD, 50,000 Units at of care
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
## 2021-05-05 NOTE — PROGRESS NOTES
WRAP UP GROUP NOTE:     Patient's Goal:  Providing feedback as to their own progress in the care-plan provided. Pt's have an opportunity to explore self-reflective skills and share any additional cares and concerns not yet addressed. Pt effectively participated. Energy level:  Low   Appetite:  Good   Concentration:  Good   Hallucinations:  Gone   Depression:  Improved   Anxiety:  Gone   How I worked today:  Blank   What helps me sleep:  Blank   Any questions/complaints/comments:  Blank     Group/activities that helped me today were:   Seeing doctor; One-to-one with staff.

## 2021-05-05 NOTE — PLAN OF CARE
Problem: Falls - Risk of:  Goal: Will remain free from falls  Description: Will remain free from falls  5/5/2021 0839 by Roldan Hudson RN  Outcome: Ongoing  5/4/2021 2254 by Kiana Patel RN  Outcome: Ongoing  Goal: Absence of physical injury  Description: Absence of physical injury  5/5/2021 0839 by Roldan Hudson RN  Outcome: Ongoing  5/4/2021 2254 by Kiana Patel RN  Outcome: Ongoing     Problem: Pain:  Goal: Pain level will decrease  Description: Pain level will decrease  5/5/2021 0839 by Roldan Hudson RN  Outcome: Ongoing  5/4/2021 2254 by Kiana Patel RN  Outcome: Ongoing  Goal: Control of acute pain  Description: Control of acute pain  5/5/2021 0839 by Roldan Hudson RN  Outcome: Ongoing  5/4/2021 2254 by Kiana Ptael RN  Outcome: Ongoing  Goal: Control of chronic pain  Description: Control of chronic pain  5/5/2021 0839 by Roldan Hudson RN  Outcome: Ongoing  5/4/2021 2254 by Kiana Patel RN  Outcome: Ongoing     Problem: Health Behavior:  Goal: Ability to verbalize adaptive coping strategies to use when suicidal feelings occur will improve  Description: Ability to verbalize adaptive coping strategies to use when suicidal feelings occur will improve  5/5/2021 0839 by Roldan Hudson RN  Outcome: Ongoing  5/4/2021 2254 by Kiana Patel RN  Outcome: Ongoing  Goal: Ability to verbalize adaptive coping strategies to use when the urge to self-mutilate occurs will improve  Description: Ability to verbalize adaptive coping strategies to use when the urge to self-mutilate occurs will improve  5/5/2021 0839 by Roldan Hudson RN  Outcome: Ongoing  5/4/2021 2254 by Kiana Patel RN  Outcome: Ongoing     Problem: Safety:  Goal: Ability to contract for his/her safety will improve  Description: Ability to contract for his/her safety will improve  5/5/2021 0839 by Roldan Hudson RN  Outcome: Ongoing  5/4/2021 2254 by Kiana Patel RN  Outcome: Ongoing     Problem: Altered Mood, Depressive Behavior:  Goal: Able to verbalize acceptance of life and situations over which he or she has no control  Description: Able to verbalize acceptance of life and situations over which he or she has no control  5/5/2021 0839 by Jan Munoz RN  Outcome: Ongoing  5/4/2021 2254 by Sami Almeida RN  Outcome: Ongoing  Goal: Able to verbalize and/or display a decrease in depressive symptoms  Description: Able to verbalize and/or display a decrease in depressive symptoms  5/5/2021 0839 by Jan Munoz RN  Outcome: Ongoing  5/4/2021 2254 by Sami Almeida RN  Outcome: Ongoing  Goal: Ability to disclose and discuss suicidal ideas will improve  Description: Ability to disclose and discuss suicidal ideas will improve  5/5/2021 0839 by aJn Munoz RN  Outcome: Ongoing  5/4/2021 2254 by Sami Almeida RN  Outcome: Ongoing  Goal: Able to verbalize support systems  Description: Able to verbalize support systems  5/5/2021 0839 by Jan Munoz RN  Outcome: Ongoing  5/4/2021 2254 by Sami Almeida RN  Outcome: Ongoing  Goal: Absence of self-harm  Description: Absence of self-harm  5/5/2021 0839 by Jan Munoz RN  Outcome: Ongoing  5/4/2021 2254 by Sami Almeida RN  Outcome: Ongoing  Goal: Patient specific goal  Description: Patient specific goal  5/5/2021 0839 by Jan Munoz RN  Outcome: Ongoing  5/4/2021 2254 by Sami Almeida RN  Outcome: Ongoing  Goal: Participates in care planning  Description: Participates in care planning  5/5/2021 0839 by Jan Munoz RN  Outcome: Ongoing  5/4/2021 2254 by Sami Almeida RN  Outcome: Ongoing     Problem: Depressive Behavior With or Without Suicide Precautions:  Goal: Able to verbalize acceptance of life and situations over which he or she has no control  Description: Able to verbalize acceptance of life and situations over which he or she has no control  5/5/2021 0839 by Jan Munoz RN  Outcome: Ongoing  5/4/2021 2254 by Sami Almeida RN  Outcome: Ongoing  Goal: Able to verbalize and/or display a decrease in depressive symptoms  Description: Able to verbalize and/or display a decrease in depressive symptoms  5/5/2021 0839 by Kathleen Moreno RN  Outcome: Ongoing  5/4/2021 2254 by Jona [place] RN  Outcome: Ongoing  Goal: Ability to disclose and discuss suicidal ideas will improve  Description: Ability to disclose and discuss suicidal ideas will improve  5/5/2021 0839 by Kathleen Moreno RN  Outcome: Ongoing  5/4/2021 2254 by Jona [place] RN  Outcome: Ongoing  Goal: Able to verbalize support systems  Description: Able to verbalize support systems  5/5/2021 0839 by Kathleen Moreno RN  Outcome: Ongoing  5/4/2021 2254 by Jona [place] RN  Outcome: Ongoing  Goal: Absence of self-harm  Description: Absence of self-harm  5/5/2021 0839 by Kathleen Moreno RN  Outcome: Ongoing  5/4/2021 2254 by Jona [place] RN  Outcome: Ongoing  Goal: Patient specific goal  Description: Patient specific goal  5/5/2021 0839 by Kathleen Moreno RN  Outcome: Ongoing  5/4/2021 2254 by Jona [place] RN  Outcome: Ongoing  Goal: Participates in care planning  Description: Participates in care planning  5/5/2021 0839 by Kathleen Moreno RN  Outcome: Ongoing  5/4/2021 2254 by Jona [place] RN  Outcome: Ongoing     Problem: Altered Mood, Deterioration in Function:  Goal: Ability to perform activities of daily living will improve  Description: Ability to perform activities of daily living will improve  5/5/2021 0839 by Kathleen Moreno RN  Outcome: Ongoing  5/4/2021 2254 by Jona [place] RN  Outcome: Ongoing  Goal: Able to verbalize reality based thinking  Description: Able to verbalize reality based thinking  5/5/2021 0839 by Kathleen Moreno RN  Outcome: Ongoing  5/4/2021 2254 by Jona [place] RN  Outcome: Ongoing  Goal: Skin appearance normal  Description: Skin appearance normal  5/5/2021 0839 by Kathleen Moreno RN  Outcome: Ongoing  5/4/2021 2254 by Jona [place] RN  Outcome: Ongoing  Goal: Maintenance of adequate nutrition will improve  Description: Maintenance of adequate nutrition will improve  5/5/2021 0839 by Jan Munoz RN  Outcome: Ongoing  5/4/2021 2254 by Sami Almeida RN  Outcome: Ongoing  Goal: Ability to tolerate increased activity will improve  Description: Ability to tolerate increased activity will improve  5/5/2021 0839 by Jan Munoz RN  Outcome: Ongoing  5/4/2021 2254 by Sami Almeida RN  Outcome: Ongoing  Goal: Participates in care planning  Description: Participates in care planning  5/5/2021 0839 by Jan Munoz RN  Outcome: Ongoing  5/4/2021 2254 by Sami Almeida RN  Outcome: Ongoing  Goal: Patient specific goal  Description: Patient specific goal  5/5/2021 0839 by Jan Munoz RN  Outcome: Ongoing  5/4/2021 2254 by Sami Almeida RN  Outcome: Ongoing     Problem: Altered Mood, Psychotic Behavior:  Goal: Able to demonstrate trust by eating, participating in treatment and following staff's direction  Description: Able to demonstrate trust by eating, participating in treatment and following staff's direction  5/5/2021 0839 by Jan Munoz RN  Outcome: Ongoing  5/4/2021 2254 by Sami Almeida RN  Outcome: Ongoing  Goal: Able to verbalize decrease in frequency and intensity of hallucinations  Description: Able to verbalize decrease in frequency and intensity of hallucinations  5/5/2021 0839 by Jan Munoz RN  Outcome: Ongoing  5/4/2021 2254 by Sami Almeida RN  Outcome: Ongoing  Goal: Able to verbalize reality based thinking  Description: Able to verbalize reality based thinking  5/5/2021 0839 by Jan Munoz RN  Outcome: Ongoing  5/4/2021 2254 by Sami Almeida RN  Outcome: Ongoing  Goal: Absence of self-harm  Description: Absence of self-harm  5/5/2021 0839 by Jan Munoz RN  Outcome: Ongoing  5/4/2021 2254 by Sami Almeida RN  Outcome: Ongoing  Goal: Ability to achieve adequate nutritional intake will improve  Description: Ability to achieve adequate nutritional intake will improve  5/5/2021 0596 by Kathleen Moreno RN  Outcome: Ongoing  5/4/2021 2254 by Jona [place] RN  Outcome: Ongoing  Goal: Ability to interact with others will improve  Description: Ability to interact with others will improve  5/5/2021 0839 by Kathleen Moreno RN  Outcome: Ongoing  5/4/2021 2254 by Jona [place] RN  Outcome: Ongoing  Goal: Compliance with prescribed medication regimen will improve  Description: Compliance with prescribed medication regimen will improve  5/5/2021 0839 by Kathleen Moreno RN  Outcome: Ongoing  5/4/2021 2254 by Jona [place] RN  Outcome: Ongoing  Goal: Patient specific goal  Description: Patient specific goal  5/5/2021 0839 by Kathleen Moreno RN  Outcome: Ongoing  5/4/2021 2254 by Jona [place] RN  Outcome: Ongoing

## 2021-05-05 NOTE — PROGRESS NOTES
Pt requesting to speak with  in regard to treatment facilities that provide PTSD therapy. Message left with social work.

## 2021-05-05 NOTE — PROGRESS NOTES
SW spoke with pt about going to rehab facility. Pt refused/declined and reported \"I do not have an issue with alcohol even though everyone keeps treating me like I do\". Pt reported he had PTSD and needed to be treated for that and not alcohol. NEGRITA voiced understanding and  notified.      Electronically signed by Jackie Shi [facility] [place] on 5/5/2021 at 10:04 AM

## 2021-05-05 NOTE — PROGRESS NOTES
When assessing pt tonight, he became very irritable. Pt \"flipped\" the pen he was using to fill out his group wrap-up sheet across the room and stated \"I'm done talking, I'm just mad. I'm going to my room. \" Earlier in the evening, when making safety rounds, the pt confided that he was ready to go home and that he wanted to talk to his wife. When asked if he had not been able to talk to his wife, he said \"yes, I have, I guess she is mad at me. She won't let me leave. \" It was explained to pt that his wife does not control his discharge, but rather the Doctors have to declare him \"stable\" for discharge as they see fit. Pt stated \"I'm ready to go home. I don't need to be here. \" Offered pt something to calm him. Pt stated \"yes. I need something. My anxiety and depression are as high as they can be. \" Administered atarax 10mg for anxiety. Will continue to monitor for effect.      Electronically signed by Chapito Nair RN on 5/4/2021 at 7:52 PM

## 2021-05-05 NOTE — PROGRESS NOTES
SW met with treatment team to discuss pt's progress and setbacks. SW 2 was present. Pt reportedly slept well last night, with medications, appetite is good, minimal participation in scheduled group activities, isolative to self, independent with ADL's, compliant with medications, behavior has been cooperative, intermittent irritability, CIWA score of 5 r/t agitation, reports severe depression/anxiety, denies SI/HI/AVH, tentative discharge Friday, continue current treatment plan.

## 2021-05-05 NOTE — PROGRESS NOTES
[facility] Daily Shift Assessment-Geriatric Unit  Nursing Progress Note          Room: [facility] [place]-   Name: Peña Murguia   Age: 45 y.o. Gender: male   Dx: <principal problem not specified>  Precautions: suicide risk, fall risk and seizure precautions  Inpatient Status: voluntary     SLEEP:    Sleep: Yes,   Sleep Quality Good   Hours Slept:    Sleep Medications: Yes; Remeron 7.5mg,  PRN Sleep Meds: No       MEDICAL:      Other PRN Meds: Yes; Atarax 25mg  Med Compliant: Yes   Accu-Chek: No   Oxygen/CPAP/BiPAP: No  CIWA/CINA: Yes; 5   PAIN Assessment: denies  Side Effects from medication: none voiced    Is Patient experiencing any respiratory symptoms (headache, fever, body aches, cough. Gayathri Unger ): no  Patient educated by nursing to practice social distancing, wear masks, wash hands frequently: yes      Metabolic Screening:    Lab Results   Component Value Date    LABA1C 5.1 05/04/2021       Lab Results   Component Value Date    CHOL 161 05/04/2021     Lab Results   Component Value Date    TRIG 127 05/04/2021     Lab Results   Component Value Date    HDL 58 05/04/2021     No components found for: LDLCAL  No results found for: LABVLDL      Body mass index is 25.37 kg/m². BP Readings from Last 2 Encounters:   05/04/21 120/80         PSYCH:     SI denies suicidal ideation    HI Negative for homicidal ideation        AVH:denies      Depression: \"as high as it can be\" Anxiety:\"as high as it can be\"        GENERAL:      Appetite: good  Social: No Speech: normal   Appearance:appropriately dressed  Assistive Devices: noneLevel of Assist: Independent      GROUP:    Group Participation: Yes  Participation LevelMinimal    Participation QualityResistant    Notes: Pt was resting in his room at the beginning of shift tonight. Pt reports that he has just \"slept all day and that he's just tired. \" Pt confides that he is ready to go home and that he thinks his wife is mad at him and trying to keep him in the hospital. Pt offered comfort and explanation. Later, during wrap-up group and assessment process, pt becomes irritable and \"storms off\" to his room. Pt verbalizes his anxiety and depression ratings to this Nurse as Maren Roof high as they can be. \" Incongruencies noted as he marked his wrap-up group page as depression is improved and anxiety is gone. Pt denies SI, HI and AVH. Pt is administered Atarax for verbalized anxiety and notable agitation. Pt is later noted as calmer. He comes back out of his room, gets a snack and watches tv for awhile longer until going to bed around 2100. Pt is not social with peers , however. When he is in tv area he simply watches tv quietly. He does not interact with others or staff. Pt denies any withdrawal symptoms. CIWA score of a 5 r/t anxiety and agitation tonight. VS stable. NO notable s/s of COVID. Education continues on infection control. Monitoring for safety continues as ordered.      Electronically signed by Edith Garcia RN on 5/4/2021 at 11:26 PM

## 2021-05-06 VITALS
WEIGHT: 146.38 LBS | BODY MASS INDEX: 24.99 KG/M2 | HEART RATE: 78 BPM | SYSTOLIC BLOOD PRESSURE: 132 MMHG | DIASTOLIC BLOOD PRESSURE: 76 MMHG | TEMPERATURE: 97.6 F | RESPIRATION RATE: 18 BRPM | HEIGHT: 64 IN | OXYGEN SATURATION: 100 %

## 2021-05-06 PROBLEM — Z72.0 TOBACCO ABUSE: Chronic | Status: ACTIVE | Noted: 2021-05-06

## 2021-05-06 PROBLEM — R45.851 SUICIDAL IDEATION: Status: RESOLVED | Noted: 2021-05-06 | Resolved: 2021-05-06

## 2021-05-06 PROBLEM — R45.850 HOMICIDAL IDEATIONS: Status: ACTIVE | Noted: 2021-05-06

## 2021-05-06 PROBLEM — R45.851 SUICIDAL IDEATION: Status: ACTIVE | Noted: 2021-05-06

## 2021-05-06 PROBLEM — R45.851 DEPRESSION WITH SUICIDAL IDEATION: Status: RESOLVED | Noted: 2021-05-03 | Resolved: 2021-05-06

## 2021-05-06 PROBLEM — F10.20 ALCOHOL USE DISORDER, SEVERE, DEPENDENCE (HCC): Chronic | Status: ACTIVE | Noted: 2021-05-06

## 2021-05-06 PROBLEM — F34.9 PERSISTENT MOOD (AFFECTIVE) DISORDER, UNSPECIFIED (HCC): Status: ACTIVE | Noted: 2021-05-03

## 2021-05-06 PROBLEM — F32.A DEPRESSION WITH SUICIDAL IDEATION: Status: RESOLVED | Noted: 2021-05-03 | Resolved: 2021-05-06

## 2021-05-06 PROBLEM — R45.850 HOMICIDAL IDEATIONS: Status: RESOLVED | Noted: 2021-05-06 | Resolved: 2021-05-06

## 2021-05-06 PROCEDURE — 99239 HOSP IP/OBS DSCHRG MGMT >30: CPT | Performed by: PSYCHIATRY & NEUROLOGY

## 2021-05-06 PROCEDURE — 6370000000 HC RX 637 (ALT 250 FOR IP): Performed by: FAMILY MEDICINE

## 2021-05-06 PROCEDURE — 6370000000 HC RX 637 (ALT 250 FOR IP): Performed by: PSYCHIATRY & NEUROLOGY

## 2021-05-06 PROCEDURE — 5130000000 HC BRIDGE APPOINTMENT

## 2021-05-06 RX ORDER — ERGOCALCIFEROL 1.25 MG/1
50000 CAPSULE ORAL WEEKLY
Qty: 11 CAPSULE | Refills: 1 | Status: SHIPPED | OUTPATIENT
Start: 2021-05-11 | End: 2021-07-21

## 2021-05-06 RX ORDER — MIRTAZAPINE 7.5 MG/1
7.5 TABLET, FILM COATED ORAL NIGHTLY
Qty: 30 TABLET | Refills: 1 | Status: SHIPPED | OUTPATIENT
Start: 2021-05-06 | End: 2021-06-05

## 2021-05-06 RX ADMIN — FOLIC ACID 1 MG: 1 TABLET ORAL at 08:44

## 2021-05-06 RX ADMIN — THIAMINE HCL TAB 100 MG 100 MG: 100 TAB at 08:44

## 2021-05-06 RX ADMIN — THERA TABS 1 TABLET: TAB at 08:44

## 2021-05-06 NOTE — PROGRESS NOTES
[facility] Daily Shift Assessment-Geriatric Unit  Nursing Progress Note          Room: [facility] [place]-   Name: Jhoana Gong   Age: 45 y.o. Gender: male   Dx: <principal problem not specified>  Precautions: suicide risk, fall risk and seizure precautions  Inpatient Status: voluntary     SLEEP:    Sleep: Yes,   Sleep Quality Good   Hours Slept:  Sleep Medications: Yes; Remeron 7.5  PRN Sleep Meds: No       MEDICAL:      Other PRN Meds: No   Med Compliant: Yes   Accu-Chek: No   Oxygen/CPAP/BiPAP: No  CIWA/CINA: Yes;0   PAIN Assessment: denies  Side Effects from medication: none voiced    Is Patient experiencing any respiratory symptoms (headache, fever, body aches, cough. Clement Fly ): no  Patient educated by nursing to practice social distancing, wear masks, wash hands frequently: yes      Metabolic Screening:    Lab Results   Component Value Date    LABA1C 5.1 05/04/2021       Lab Results   Component Value Date    CHOL 161 05/04/2021     Lab Results   Component Value Date    TRIG 127 05/04/2021     Lab Results   Component Value Date    HDL 58 05/04/2021     No components found for: LDLCAL  No results found for: LABVLDL      Body mass index is 25.13 kg/m². BP Readings from Last 2 Encounters:   05/05/21 118/77         PSYCH:     SI denies suicidal ideation    HI Negative for homicidal ideation        AVH:denies      Depression: \"maybe a 1\" Anxiety: \"maybe a 1\"       GENERAL:      Appetite: no change from normal  Social: Yes Speech: normal   Appearance:appropriately dressed  Assistive Devices: noneLevel of Assist: Independent      GROUP:    Group Participation: Yes  Participation LevelInteractive    Participation QualityAppropriate    Notes: Pt was cooperative with his interview tonight. He has a brightened affect and is somewhat cheerful tonight, laughing and joking with peers and staff. Pt is much more social with peers than previously witnessed. Pt denies any withdrawal s/s and CIWA score is a 0 tonight.  Pt rates his anxiety and depression as \"maybe a 1\" tonight and denies SI, HI, and AVH. Pt has had a healthy appetite, snacking often, and on the phone a couple of times where the calls seem to have had no adverse outcome on pt mood. Pt denies pain and VS are stable. Monitoring for safety continues as ordered.      Electronically signed by Sam Hamilton RN on 5/5/2021 at 11:33 PM

## 2021-05-06 NOTE — PROGRESS NOTES
Group Therapy Note    Date: 05/06/21  Start Time: 0800  End [id number]    Number of Participants: 4    Type of Group: self inventory    Patient's Goal:  Attending group    Notes:  Patient participated in group and filling out self inventory    Status After Intervention:  Improved    Participation Level:  Active Listener and Interactive    Participation Quality: Appropriate    Speech:  normal    Thought Process/Content: Logical    Affective Functioning: Congruent    Mood: calm    Level of consciousness:  Alert and Oriented x4    Response to Learning: Progressing to goal    Modes of Intervention: Education and Support    Discipline Responsible: Registered Nurse    Signature: Ezio Jauregui RN

## 2021-05-06 NOTE — PROGRESS NOTES
Pt awake at this time with c/o his hands itching. Lotion given to pt to apply to his hands. Pt applied lotion generously and voiced relief. No other distress voiced. No SOB noted. Monitoring for relief of symptoms will continue.      Electronically signed by Pau Pearl RN on 5/6/2021 at 1:09 AM

## 2021-05-06 NOTE — PLAN OF CARE
Problem: Falls - Risk of:  Goal: Will remain free from falls  Description: Will remain free from falls  Outcome: Ongoing  Goal: Absence of physical injury  Description: Absence of physical injury  Outcome: Ongoing     Problem: Pain:  Goal: Pain level will decrease  Description: Pain level will decrease  Outcome: Ongoing  Goal: Control of acute pain  Description: Control of acute pain  Outcome: Ongoing  Goal: Control of chronic pain  Description: Control of chronic pain  Outcome: Ongoing     Problem: Health Behavior:  Goal: Ability to verbalize adaptive coping strategies to use when suicidal feelings occur will improve  Description: Ability to verbalize adaptive coping strategies to use when suicidal feelings occur will improve  5/5/2021 2313 by Sami Almeida RN  Outcome: Ongoing  5/5/2021 0951 by Jared Lewis  Outcome: Ongoing  Note:                                                                     Group Therapy Note    Date: 5/5/2021  Start Time: 0900  End Time:  0930  Number of Participants: 3    Type of Group: Psychoeducation    Wellness Binder Information  Module Name:  Women's Issues  Session Number:  4    Group Goal for Pt: To raise awareness of how thoughts influence feelings    Notes:  Pt demonstrated improved awareness of how thoughts influence feelings by actively participating in group discussion. Status After Intervention:  Unchanged    Participation Level:  Active Listener and Interactive    Participation Quality: Appropriate and Attentive      Speech:  normal      Thought Process/Content: Logical      Affective Functioning: Congruent      Mood: anxious and depressed      Level of consciousness:  Alert and Oriented x4      Response to Learning: Able to verbalize current knowledge/experience, Able to verbalize/acknowledge new learning, and Progressing to goal      Endings: None Reported    Modes of Intervention: Education      Discipline Responsible: Psychoeducational Specialist Absence of self-harm  Outcome: Ongoing  Goal: Ability to achieve adequate nutritional intake will improve  Description: Ability to achieve adequate nutritional intake will improve  Outcome: Ongoing  Goal: Ability to interact with others will improve  Description: Ability to interact with others will improve  Outcome: Ongoing  Goal: Compliance with prescribed medication regimen will improve  Description: Compliance with prescribed medication regimen will improve  Outcome: Ongoing  Goal: Patient specific goal  Description: Patient specific goal  Outcome: Ongoing

## 2021-05-06 NOTE — PROGRESS NOTES
WRAP UP GROUP NOTE    Patient's Goal:  Providing feedback as to their own progress in the care-plan provided. Patients have an opportunity to explore self-reflective skills and share any additional cares and concerns not yet addressed. Pt effectively participated.     Energy Level:normal  Appetite:good  Concentration:good  Hallucinations:gone  Depression:same  Anxiety:improved  How I worked today:tried a little  What helps me sleep:try a relaxation exercise  Any questions/complaints/comments: \"no\"    Group/activities that helped me today were:    Self-Enhancement  Therapeutic Recreation  Seeing Doctor(s)  One-to-One with staff    Electronically signed by Griffin Rojo RN on 5/5/2021 at 9:09 PM

## 2021-05-06 NOTE — PROGRESS NOTES
Discharge Note     Pt discharging on this date. Pt denies SI, HI, and AVH at this time. Pt reports improvement in behavior and is leaving unit in overall good condition. SW and pt discussed pt's follow up appointments and importance of attending appointments as scheduled, pt voiced understanding and agreement. Pt and SW also discussed pt safety plan and pt able to verbally identify: warning signs, coping strategies, places and people that help make the pt feel better/distract negative thoughts, friends/family/agencies/professionals the pt can reach out to in a crisis, and something that is important to the pt/worth living for. Pt provided the national suicide prevention hotline number (6-854-543-4425) as well as local community behavioral health [facility]) crisis number for emergencies (5-081-401-543-100-5354). Pt to follow up with: Laurita Harrell for therapy and medication management on 6/1  FReferral to out patient tobacco cessation counseling treatment  Patient refused referral to outpatient tobacco cessation counseling    SW offered to assist pt with transportation, pt reports that he will need assistance with transportation home.  NEGRITA arranged for a cab to transport corazon

## 2021-05-06 NOTE — DISCHARGE SUMMARY
Discharge Summary     Patient ID:  Malini Grider  [id number]  1 y.o.  1970    Admit date: 5/3/2021  Discharge date: 5/6/2021    Admitting Physician: Crystal Chapman MD   Attending Physician: Crystal Chapman MD  Discharge Provider: Crystal Chapman MD     Admission Diagnoses:   Mood disorder unspecified  R/o substance-induced mood disorder  PTSD by history  Alcohol use disorder, severe  Tobacco use disorder    Discharge Diagnoses:   Mood disorder unspecified  Alcohol use disorder, severe  Tobacco use disorder  Vitamin D deficiency    Admission Condition: poor    Discharged Condition: stable    Indication for Admission: SI and HI    CHIEF COMPLAINT:  \"I'm ok\"     History obtained from: patient, chart     HISTORY OF PRESENT ILLNESS:    46 yo white male with history of depression, anxiety, PTSD, alcohol use disorder, transferred from OSH for SI and HI towards his wife. He reportedly threatened to shoot himself and his wife.  originally. Patient was assessed by [facility]. He has been noncompliant with his psychotropics at home.      Patient is observed resting in bed this morning. He is calm and cooperative. Appears drowsy. Denies suicidal and homicidal ideation at this time. Reports some depression and anxiety. States his PTSD was recently triggered by his friend's  accident. He was drinking prior to admission and got into an argument with his wife. States they have been having arguments for a while and he has been feeling angry a lot. He denies any recent stressors. Feels that pandemic has affected him a lot. \"I no longer leave my house. \"  Patient reports occasional mood swings and racing thoughts. He reports good sleep and appetite. No nightmares and flashbacks. Often feels irritable. Most recently, started on BuSpar for anxiety. He is open to medication adjustment. States trazodone made him groggy this morning. He denies withdrawal symptoms.   Gave us permission or others. Number of antipsychotic medication prescribed at discharge: 0  IF MORE THAN ONE IS USED: NA    History of greater than 3 failed multiple monotherapy trials: NA  Monotherapy taper plan/ cross taper in progress: NA  Augmentation of Clozapine: NA    Referral to addiction treatment: patient refused    Prescription for Alcohol or Drug Disorder Medication: patient refused    Prescription for Tobacco Cessation medication: none    If no prescriptions for Tobacco Cessation must document why: patient refused     Consults: Internal medicine    Significant Diagnostic Studies:   No results found for: WBC, HGB, HCT, MCV, PLT  No results found for: NA, K, CL, CO2, BUN, CREATININE, GLUCOSE, CALCIUM, PROT, LABALBU, BILITOT, ALKPHOS, AST, ALT, LABGLOM, GFRAA, AGRATIO, GLOB      Lab Results   Component Value Date    CBUEIKYT39 436 05/04/2021     Lab Results   Component Value Date    VITD25 24.9 (L) 05/04/2021     Lab Results   Component Value Date    CHOL 161 05/04/2021     Lab Results   Component Value Date    TRIG 127 05/04/2021     Lab Results   Component Value Date    HDL 58 05/04/2021     Lab Results   Component Value Date    LDLCALC 78 05/04/2021     No results found for: LABVLDL, VLDL  No results found for: CHOLHDLRATIO  Lab Results   Component Value Date    LABA1C 5.1 05/04/2021     No results found for: EAG  Lab Results   Component Value Date    TSHFT4 1.48 05/04/2021       Treatments: RN and SW    Mental status examination at the time of discharge:  Alert, Oriented X 4  Appearance:  Improved Hygiene, smiling  Speech with Regular Rate and Rhythm  Eye Contact:  Good  No Psychomotor Agitation/Retardation Noted  Attitude:  Cooperative  Mood:  \"Good. Can't wait to go home! \"  Affective: Congruent, appropriate to the situation, with a normal range and intensity  Thought Processes:  Coherently communicated, logical and goal oriented  Thought Content:  No Suicidal Ideation, No Homicidal Ideation, No Auditory or Visual Hallucinations, NO Overt Delusions  Insight: Improved  Judgement: Improved  Memory is intact for both remote and recent  Intellectual Functioning:  Within the [address] of Knowledge:  Adequate  Attention and Concentration:  Adequate    Discharge Exam:  Please, see medical note    Disposition: home    Patient Instructions:   Current Discharge Medication List      START taking these medications    Details   mirtazapine (REMERON) 7.5 MG tablet Take 1 tablet by mouth nightly  Qty: 30 tablet, Refills: 1      vitamin D (ERGOCALCIFEROL) 1.25 MG (43814 UT) CAPS capsule Take 1 capsule by mouth once a week for 11 doses  Qty: 11 capsule, Refills: 1         CONTINUE these medications which have NOT CHANGED    Details   meloxicam (MOBIC) 15 MG tablet Take 15 mg by mouth daily Indications: Inflammation, Pain Take 1 tablet (15 mg) by oral route once daily for pain and inflammation with food         STOP taking these medications       HYDROcodone-acetaminophen (NORCO) 5-325 MG per tablet Comments:   Reason for Stopping:         tiZANidine (ZANAFLEX) 4 MG tablet Comments:   Reason for Stopping:         busPIRone (BUSPAR) 10 MG tablet Comments:   Reason for Stopping:               Activity: as tolerated  Diet: regular diet  Wound Care: none needed    Follow-up:   Follow up with PCP in 2 week(s)  on follow up with PCP, please review labs/imaging done during this Hospital stay, and discuss any additional/repeat testing or treatment needed with your PCP    Jun1 Go to [address] [place] [address]  Tuesday Jun 1, 2021  Medication mangement appt with Dr. Aminta Zapata at 11:00am, please provide a photo id, sec soc card, insurance card, list of medications.   [address] [place] [address]   Phone: [phone]   Fax: [phone]         Time worked: 31 min    Participation: good    Electronically signed by Mimi Saldaña MD on 5/6/2021 at 9:39 AM

## 2021-05-06 NOTE — PROGRESS NOTES
[facility]  Discharge Note  Bridge Appointment completed: Reviewed Discharge Instructions with patient. Patient verbalizes understanding and agreement with the discharge plan using the teachback method. Referral for Outpatient Tobacco Cessation Counseling, upon discharge (lorna X if applicable and completed):    ( )  Hospital staff assisted patient to call Quit Line or faxed referral                                   during hospitalization                  ( )  Recognizing danger situations (included triggers and roadblocks), if not completed on admission                    ( )  Coping skills (new ways to manage stress, exercise, relaxation techniques, changing routine, distraction), if not completed on admission                                                           ( )  Basic information about quitting (benefits of quitting, techniques in how to quit, available resources, if not completed on admission  ( ) Referral for counseling faxed to [facility]   (x ) Patient refused referral  ( x) Patient refused counseling  ( x) Patient refused smoking cessation medication upon discharge    Vaccinations (lorna X if applicable and completed):  ( ) Patient states already received influenza vaccine elsewhere  ( ) Patient received influenza vaccine during this hospitalization  (x ) Patient refused influenza vaccine at this time      Pt discharged with followings belongings:   Dentures: None(upper missing teeth are missing)  Vision - Corrective Lenses: Glasses(reading glasses, are not with patient at time of admission)  Hearing Aid: Bilateral hearing aids  Jewelry: None  Body Piercings Removed: N/A  Clothing: Shirt, Footwear(swim shorts)  Were All Patient Medications Collected?: Yes(one bottle of medication upon admission,)  Other Valuables: Cell phone, Money (Comment), Wallet   Valuables sent home with patient. Valuables retrieved from safe and returned to patient.   Patient left department with  [place] via  cab, discharged to  home. Patient education on aftercare instructions: yes  Patient verbalize understanding of AVS:  yes. Suicidal Ideations? No AVH? denie HI?  Negative for homicidal ideation       Status EXAM upon discharge:  Status and Exam  Normal: Yes  Facial Expression: Brightened  Affect: Appropriate  Level of Consciousness: Alert  Mood:Normal: Yes  Mood: Anxious  Motor Activity:Normal: Yes  Motor Activity: Decreased  Interview Behavior: Cooperative  Preception: [place] to Person, Tawnya Kip to Time, [place] to Place, [place] to Situation  Attention:Normal: Yes  Attention: Distractible  Thought Processes: Circumstantial  Thought Content:Normal: Yes  Thought Content: Preoccupations  Hallucinations: None  Delusions: No  Memory:Normal: Yes  Memory: Poor Recent  Insight and Judgment: Yes  Insight and Judgment: Poor Judgment, Poor Insight  Present Suicidal Ideation: No  Present Homicidal Ideation: No

## 2021-05-06 NOTE — PROGRESS NOTES
SW met with treatment team to discuss pt's progress and setbacks. SW 2 was present. Pt reportedly slept well last night, with medications, appetite is good, attends scheduled group activities, more social with peers/staff, performs ADL's, compliant with medications, CIWA score is 0, behavior has been cooperative, affect bright, reports mild depression/anxiety, denies SI/HI/AVH, will be discharged today, follow-up appointments will be scheduled.

## 2021-05-06 NOTE — PLAN OF CARE
Problem: Health Behavior:  Goal: Ability to verbalize adaptive coping strategies to use when suicidal feelings occur will improve  Description: Ability to verbalize adaptive coping strategies to use when suicidal feelings occur will improve  5/6/2021 0836 by Chris Clancy RN  Outcome: Ongoing  5/5/2021 2313 by Inga Chase RN  Outcome: Ongoing     Problem: Altered Mood, Depressive Behavior:  Goal: Able to verbalize acceptance of life and situations over which he or she has no control  Description: Able to verbalize acceptance of life and situations over which he or she has no control  5/6/2021 0836 by Chris Clancy RN  Outcome: Ongoing  5/5/2021 2313 by Inga Chase RN  Outcome: Ongoing     Problem: Altered Mood, Depressive Behavior:  Goal: Absence of self-harm  Description: Absence of self-harm  5/6/2021 0836 by Chris Clancy RN  Outcome: Ongoing  5/5/2021 2313 by Inga Chase RN  Outcome: Ongoing

## 2021-05-06 NOTE — PROGRESS NOTES
[facility] Daily Shift Assessment  Nursing Progress Note    Room: 20/620-01 Name: Merlyn Christie Age: 45 y.o. Gender: male   Dx: <principal problem not specified>  Precautions: close watch and suicide risk  Target Symptoms:   Accu-Chek: NoSleep: Yes,Sleep Quality Good SI No AVH denies [address] [place] [address]  ADLs: Yes Speech: normal Depression: 1 Anxiety: 1   Participation LevelActive Listener and Interactive  Appetite: Good  Respiratory symptoms: No Headache: No Body aches: No Fever: No Cough: No  Patients encouraged to wear masks, wash hands frequently and practice social distancing while on the unit: Yes  Visitation: No   Participation QualityAppropriate    Complaints:none    Notes: Patient is calm, cooperative, and coherent.      Signature: Carolyne Yadav RN

## 2021-05-07 NOTE — PROGRESS NOTES
Progress Note  Louis Stubbs  5/7/2021 7:12 AM  Subjective:   Admit Date:   5/3/2021      CC/ADMIT DX:       Interval History:   Reviewed overnight events and nursing notes. He denies any new medical concerns. I have reviewed all labs/diagnostics from the last 24hrs. ROS:   I have done a 10 point ROS and all are negative, except what is mentioned in the HPI. No diet orders on file    Medications:             Objective:   Vitals: /76   Pulse 78   Temp 97.6 °F (36.4 °C) (Temporal)   Resp 18   Ht 5' 4\" (1.626 m)   Wt 146 lb 6 oz (66.4 kg)   SpO2 100%   BMI 25.13 kg/m²  No intake or output data in the 24 hours ending 05/07/21 0712  General appearance: alert and cooperative with exam  Extremities: extremities normal, atraumatic, no cyanosis or edema  Neurologic:  No obvious focal neurologic deficits. Skin: no rashes    Assessment and Plan:   Principal Problem:    Persistent mood (affective) disorder, unspecified (HCC)  Active Problems:    Alcohol use disorder, severe, dependence (HCC)    Tobacco abuse  Resolved Problems:    Homicidal ideations    Suicidal ideation    Vit D Def    Plan:  1. Continue present medication(s)   2.  He remains medically stable. I will monitor for an changes or concerns. 3.  Follow with Psych      Discharge planning:   home     Reviewed treatment plans with the patient and/or family.              Electronically signed by Melissa Bundy MD on 5/7/2021 at 7:12 AM